# Patient Record
Sex: FEMALE | HISPANIC OR LATINO | URBAN - METROPOLITAN AREA
[De-identification: names, ages, dates, MRNs, and addresses within clinical notes are randomized per-mention and may not be internally consistent; named-entity substitution may affect disease eponyms.]

---

## 2019-08-30 ENCOUNTER — IMPORTED ENCOUNTER (OUTPATIENT)
Dept: URBAN - METROPOLITAN AREA CLINIC 1 | Facility: CLINIC | Age: 74
End: 2019-08-30

## 2019-08-30 PROBLEM — H16.143: Noted: 2019-08-30

## 2019-08-30 PROBLEM — H04.123: Noted: 2019-08-30

## 2019-08-30 PROBLEM — Z96.1: Noted: 2019-08-30

## 2019-08-30 PROBLEM — E11.9: Noted: 2019-08-30

## 2019-08-30 PROBLEM — Z79.84: Noted: 2019-08-30

## 2019-08-30 PROCEDURE — 92015 DETERMINE REFRACTIVE STATE: CPT

## 2019-08-30 PROCEDURE — 92004 COMPRE OPH EXAM NEW PT 1/>: CPT

## 2019-08-30 NOTE — PATIENT DISCUSSION
1.  DM Type II (Oral Meds) -- Without sign of diabetic retinopathy and no blot heme on dilated retinal examination today OU and no Macular Edema OU: Discussed the pathophysiology of diabetes and its effect on the eye and risk of blindness. Stressed the importance of strong glucose control. Advised of importance of at least yearly dilated examinations but to contact us immediately for any problems or concerns. 2. RAFI w/ PEK OU -- Recommend increase the frequent use of OTC PF AT's TID-QID OU Routinely (Sample of Refresh Optive PF [place]-3 Given). 3.  Pseudophakia OU (Done Elsewhere OU) -- Doing well. Letter to Dr. Jesenia Hardy. Finalized Glasses MRx was given to patient today. Return for an appointment in 1 YR for a 30 OU with Dr. Nieves.

## 2022-03-18 NOTE — PATIENT DISCUSSION
Prescribed tobradex TID x 1 week. Recommended cool compresses & pres free ATs PRN.  RTC 2-3 weeks/sooner if symptoms increase/persist.

## 2022-04-02 ASSESSMENT — VISUAL ACUITY
OD_CC: 20/50
OS_CC: 20/60-2
OD_SC: 20/40-2
OS_SC: 20/40+1

## 2022-04-02 ASSESSMENT — TONOMETRY
OS_IOP_MMHG: 15
OD_IOP_MMHG: 16

## 2022-04-07 NOTE — PATIENT DISCUSSION
Will switch to Pred Forte QID x 5 days then TID x 5 days then BID x 5 days.  RTC 2-3 weeks/sooner if symptoms increase/persist.

## 2022-08-18 ENCOUNTER — NEW PATIENT (OUTPATIENT)
Dept: URBAN - METROPOLITAN AREA CLINIC 1 | Facility: CLINIC | Age: 77
End: 2022-08-18

## 2022-08-18 DIAGNOSIS — H16.143: ICD-10-CM

## 2022-08-18 DIAGNOSIS — H43.812: ICD-10-CM

## 2022-08-18 DIAGNOSIS — H04.123: ICD-10-CM

## 2022-08-18 DIAGNOSIS — H35.363: ICD-10-CM

## 2022-08-18 DIAGNOSIS — E11.9: ICD-10-CM

## 2022-08-18 DIAGNOSIS — Z96.1: ICD-10-CM

## 2022-08-18 PROCEDURE — 92015 DETERMINE REFRACTIVE STATE: CPT

## 2022-08-18 PROCEDURE — 99204 OFFICE O/P NEW MOD 45 MIN: CPT

## 2022-08-18 ASSESSMENT — VISUAL ACUITY
OS_BAT: 20/40
OS_CC: 20/25
OD_CC: 20/40
OS_CC: J1
OD_CC: J1
OD_BAT: 20/60

## 2022-08-18 ASSESSMENT — TONOMETRY
OD_IOP_MMHG: 16
OS_IOP_MMHG: 16

## 2022-08-30 NOTE — PATIENT DISCUSSION
CATARACTS, OU: VISUALLY SIGNIFICANT. OPTION OF SURGERY VERSUS FOLLOWING VERSUS UPDATING  GLASSES DISCUSSED. RBA'S DISCUSSED, PATIENT UNDERSTANDS AND DESIRES SURGERY TO INCREASE  VISION FOR COMPUTER AND DRIVING AT NIGHT/GLARE FROM LIGHTS. SCHEDULE CATARACT SURGERY/PRE-OP .

## 2022-08-31 NOTE — PATIENT DISCUSSION
RBA'S DISCUSSED, PATIENT UNDERSTANDS AND DESIRES TO PROCEED WITH  SURGERY. Omni One Drop Escribed today. CONSENT READ AND SIGNED. PATIENT DESIRES Panoptix Toric Left eye.

## 2022-10-13 NOTE — PATIENT DISCUSSION
The patient has noticed an improvement in their visual symptoms in the  operative eye. The patient complains of decreased vision in the fellow eye when watching tv, using a computer and driving at night due to glare. It was explained to the patient that the decision to proceed with cataract  surgery in the fellow eye is entirely a separate decision from the surgical eye. All of the same risks,  benefits and alternatives were reviewed with the patient again. The patient does feel the vision in the  non-operative eye is limiting their daily activities and elects to proceed with cataract surgery in the  right eye. Schedule cataract surgery/ pre op OD.

## 2022-10-13 NOTE — PATIENT DISCUSSION
DOING WELL. CONTINUE DROPS AS DIRECTED. SPECS RX OFFERED. RX ARC IN SPECS TO MINIMIZE GLARE. RETURN FOR FOLLOW-UP AS SCHEDULED.

## 2022-10-13 NOTE — PATIENT DISCUSSION
RBA'S DISCUSSED, PATIENT UNDERSTANDS AND DESIRES TO PROCEED WITH  SURGERY. CONSENT READ AND SIGNED. PATIENT DESIRES PANOPTIX TORIC MULTIFOCAL OD.

## 2023-08-22 ENCOUNTER — COMPREHENSIVE EXAM (OUTPATIENT)
Dept: URBAN - METROPOLITAN AREA CLINIC 1 | Facility: CLINIC | Age: 78
End: 2023-08-22

## 2023-08-22 DIAGNOSIS — H04.123: ICD-10-CM

## 2023-08-22 DIAGNOSIS — H35.363: ICD-10-CM

## 2023-08-22 DIAGNOSIS — E11.9: ICD-10-CM

## 2023-08-22 DIAGNOSIS — H16.143: ICD-10-CM

## 2023-08-22 DIAGNOSIS — Z96.1: ICD-10-CM

## 2023-08-22 DIAGNOSIS — H43.812: ICD-10-CM

## 2023-08-22 PROCEDURE — 99214 OFFICE O/P EST MOD 30 MIN: CPT

## 2023-08-22 ASSESSMENT — VISUAL ACUITY
OD_CC: 20/25
OS_CC: 20/25

## 2023-08-22 ASSESSMENT — TONOMETRY
OS_IOP_MMHG: 13
OD_IOP_MMHG: 13

## 2024-08-19 ENCOUNTER — COMPREHENSIVE EXAM (OUTPATIENT)
Dept: URBAN - METROPOLITAN AREA CLINIC 1 | Facility: CLINIC | Age: 79
End: 2024-08-19

## 2024-08-19 DIAGNOSIS — H35.363: ICD-10-CM

## 2024-08-19 DIAGNOSIS — H43.812: ICD-10-CM

## 2024-08-19 DIAGNOSIS — E11.9: ICD-10-CM

## 2024-08-19 DIAGNOSIS — Z96.1: ICD-10-CM

## 2024-08-19 DIAGNOSIS — H16.143: ICD-10-CM

## 2024-08-19 DIAGNOSIS — H04.123: ICD-10-CM

## 2024-08-19 PROCEDURE — 92015 DETERMINE REFRACTIVE STATE: CPT

## 2024-08-19 PROCEDURE — 99214 OFFICE O/P EST MOD 30 MIN: CPT

## 2024-08-19 ASSESSMENT — VISUAL ACUITY
OS_CC: 20/25-1
OD_CC: J1
OS_CC: J1
OD_CC: 20/25-2

## 2024-08-19 ASSESSMENT — TONOMETRY
OS_IOP_MMHG: 13
OD_IOP_MMHG: 13

## 2025-08-26 ENCOUNTER — COMPREHENSIVE EXAM (OUTPATIENT)
Age: 80
End: 2025-08-26

## 2025-08-26 DIAGNOSIS — H04.123: ICD-10-CM

## 2025-08-26 DIAGNOSIS — Z96.1: ICD-10-CM

## 2025-08-26 DIAGNOSIS — H35.361: ICD-10-CM

## 2025-08-26 DIAGNOSIS — H35.3121: ICD-10-CM

## 2025-08-26 DIAGNOSIS — E11.9: ICD-10-CM

## 2025-08-26 DIAGNOSIS — H16.143: ICD-10-CM

## 2025-08-26 DIAGNOSIS — H43.812: ICD-10-CM

## 2025-08-26 PROCEDURE — 99214 OFFICE O/P EST MOD 30 MIN: CPT
